# Patient Record
Sex: FEMALE | Race: BLACK OR AFRICAN AMERICAN | NOT HISPANIC OR LATINO | Employment: UNEMPLOYED | URBAN - METROPOLITAN AREA
[De-identification: names, ages, dates, MRNs, and addresses within clinical notes are randomized per-mention and may not be internally consistent; named-entity substitution may affect disease eponyms.]

---

## 2017-06-16 ENCOUNTER — TELEPHONE (OUTPATIENT)
Dept: FAMILY MEDICINE | Facility: CLINIC | Age: 22
End: 2017-06-16

## 2017-06-16 NOTE — TELEPHONE ENCOUNTER
----- Message from Jennifer Treviño sent at 6/16/2017  1:23 PM CDT -----  Contact: self  Patient called requesting a copy of last TB results and would like to  today. Please contact her at [phone].    Thanks!

## 2017-06-20 ENCOUNTER — TELEPHONE (OUTPATIENT)
Dept: FAMILY MEDICINE | Facility: CLINIC | Age: 22
End: 2017-06-20

## 2017-06-20 NOTE — TELEPHONE ENCOUNTER
----- Message from Cary John sent at 6/20/2017  2:24 PM CDT -----  patient is requesting a TB skin test for tomorrow. Please call at [phone] thank you!

## 2017-06-20 NOTE — TELEPHONE ENCOUNTER
Advised patient that she isn't due for another PPD placement until 10/2017. Patient verbalized understanding.

## 2017-10-06 ENCOUNTER — TELEPHONE (OUTPATIENT)
Dept: FAMILY MEDICINE | Facility: CLINIC | Age: 22
End: 2017-10-06

## 2017-10-06 NOTE — TELEPHONE ENCOUNTER
----- Message from Jennifer Treviño sent at 10/6/2017  3:45 PM CDT -----  Contact: self  Patient called stating that she went to ED with flu symptoms was sent home stating that she did not have the flu. She brought her daughter to ED with the same symptoms and she was diagnosed with he flu. Patient is requesting some type of medication to help. Please contact her at [phone]    Thanks!

## 2017-10-07 NOTE — TELEPHONE ENCOUNTER
I would need to know when she went to the ED.  The medication is only good if started within 72 hours of the symptoms starting.    Thanks,  Dr. Mcelroy

## 2017-12-22 ENCOUNTER — HOSPITAL ENCOUNTER (OUTPATIENT)
Dept: RADIOLOGY | Facility: OTHER | Age: 22
Discharge: HOME | End: 2017-12-22
Attending: STUDENT IN AN ORGANIZED HEALTH CARE EDUCATION/TRAINING PROGRAM
Payer: MEDICAID

## 2017-12-22 ENCOUNTER — OFFICE VISIT (OUTPATIENT)
Dept: OBSTETRICS AND GYNECOLOGY | Facility: CLINIC | Age: 22
End: 2017-12-22
Payer: MEDICAID

## 2017-12-22 VITALS
BODY MASS INDEX: 28.13 KG/M2 | SYSTOLIC BLOOD PRESSURE: 120 MMHG | DIASTOLIC BLOOD PRESSURE: 80 MMHG | HEIGHT: 63 IN | WEIGHT: 158.75 LBS

## 2017-12-22 DIAGNOSIS — R10.2 PELVIC PAIN: ICD-10-CM

## 2017-12-22 DIAGNOSIS — N92.6 IRREGULAR UTERINE BLEEDING: ICD-10-CM

## 2017-12-22 DIAGNOSIS — Z20.2 POSSIBLE EXPOSURE TO STD: ICD-10-CM

## 2017-12-22 DIAGNOSIS — N89.8 VAGINAL DISCHARGE: ICD-10-CM

## 2017-12-22 DIAGNOSIS — R10.2 PELVIC PAIN: Primary | ICD-10-CM

## 2017-12-22 LAB
B-HCG UR QL: NEGATIVE
C TRACH DNA SPEC QL NAA+PROBE: NOT DETECTED
CANDIDA RRNA VAG QL PROBE: NEGATIVE
CTP QC/QA: YES
G VAGINALIS RRNA GENITAL QL PROBE: POSITIVE
N GONORRHOEA DNA SPEC QL NAA+PROBE: NOT DETECTED
T VAGINALIS RRNA GENITAL QL PROBE: NEGATIVE

## 2017-12-22 PROCEDURE — 99203 OFFICE O/P NEW LOW 30 MIN: CPT | Mod: S$PBB,,, | Performed by: OBSTETRICS & GYNECOLOGY

## 2017-12-22 PROCEDURE — 87480 CANDIDA DNA DIR PROBE: CPT

## 2017-12-22 PROCEDURE — 76830 TRANSVAGINAL US NON-OB: CPT | Mod: 26,,, | Performed by: INTERNAL MEDICINE

## 2017-12-22 PROCEDURE — 99999 PR PBB SHADOW E&M-EST. PATIENT-LVL III: CPT | Mod: PBBFAC,,, | Performed by: OBSTETRICS & GYNECOLOGY

## 2017-12-22 PROCEDURE — 81025 URINE PREGNANCY TEST: CPT | Mod: PBBFAC,PO | Performed by: OBSTETRICS & GYNECOLOGY

## 2017-12-22 PROCEDURE — 99213 OFFICE O/P EST LOW 20 MIN: CPT | Mod: PBBFAC,25,PO | Performed by: OBSTETRICS & GYNECOLOGY

## 2017-12-22 PROCEDURE — 76856 US EXAM PELVIC COMPLETE: CPT | Mod: TC

## 2017-12-22 PROCEDURE — 87491 CHLMYD TRACH DNA AMP PROBE: CPT

## 2017-12-22 PROCEDURE — 76856 US EXAM PELVIC COMPLETE: CPT | Mod: 26,,, | Performed by: INTERNAL MEDICINE

## 2017-12-22 NOTE — PROGRESS NOTES
"History & Physical  Gynecology      SUBJECTIVE:     Chief Complaint: Pelvic Pain and Vaginal Bleeding (irregular bleeding)       History of Present Illness:  Patient is a 23yo  who presents complaining of pelvic pain and irregular bleeding. She had an  12 months ago. Prior to this, she reports regular cycles with normal flow. She had a Mirena placed in 2017, and complained of heavy bleeding. The mirena was removed in July. Patient reports the bleeding continued after Mirena removal. She will have bleeding for 2 weeks approximately twice per month. She has not bled since .    Patient also complains of pelvic pain that she describes as "mild contractions." she denies Fever/chills/N/V.    She has a questionable history of ovarian and breast cancer in her family. Reports her mother and grandmother were diagnosed in their 20s, but have survived.       Review of patient's allergies indicates:   Allergen Reactions    Amoxicillin Swelling    Augmentin [amoxicillin-pot clavulanate]     Naproxen      rash       Past Medical History:   Diagnosis Date    Depression     Migraine headache     Sinusitis      History reviewed. No pertinent surgical history.  OB History      Para Term  AB Living    2 2 1 1   1    SAB TAB Ectopic Multiple Live Births                     Family History   Problem Relation Age of Onset    Hypertension Mother     Ovarian cancer Mother     Colon cancer Maternal Aunt      Social History   Substance Use Topics    Smoking status: Never Smoker    Smokeless tobacco: Never Used    Alcohol use No       Current Outpatient Prescriptions   Medication Sig    cetirizine (ZYRTEC) 10 MG tablet TK 1 T PO QD PRN FOR CONGESTION    pantoprazole (PROTONIX) 40 MG tablet TK 1 T PO QD    PRENATAL 28 mg iron- 800 mcg Tab TK 1 T PO QD    promethazine (PHENERGAN) 25 MG tablet TK 1 T PO Q 6 H PRN FOR NAUSEA OR HEADACHES     No current facility-administered medications for this visit.  "         Review of Systems:  Review of Systems   Constitutional: Negative.  Negative for chills and fever.   Eyes: Negative.    Respiratory: Negative for shortness of breath.    Cardiovascular: Negative for chest pain.   Gastrointestinal: Negative for abdominal pain, bloating and constipation.   Endocrine: Negative.    Genitourinary: Positive for menstrual problem, pelvic pain and vaginal bleeding. Negative for dyspareunia and frequency.   Musculoskeletal: Negative for back pain.   Skin:  Negative.   Neurological: Negative for headaches.   Hematological: Negative.    Psychiatric/Behavioral: Negative.    Breast: Negative for breast mass and breast pain       OBJECTIVE:     Physical Exam:  Physical Exam   Constitutional: She is oriented to person, place, and time. She appears well-developed and well-nourished. No distress.   Cardiovascular: Normal rate and regular rhythm.    Pulmonary/Chest: Effort normal. No respiratory distress.   Abdominal: Soft. She exhibits no distension. There is no tenderness. There is no guarding.   Genitourinary: There is no rash, tenderness, lesion or injury on the right labia. There is no rash, tenderness, lesion or injury on the left labia. Uterus is not tender. Cervix exhibits no motion tenderness and no friability. Right adnexum displays no mass and no tenderness. Left adnexum displays no mass and no tenderness. No tenderness or bleeding in the vagina. No signs of injury around the vagina. No vaginal discharge found.   Musculoskeletal: Normal range of motion. She exhibits no edema or tenderness.   Neurological: She is alert and oriented to person, place, and time.   Skin: She is not diaphoretic.         ASSESSMENT:       ICD-10-CM ICD-9-CM    1. Pelvic pain R10.2 [id number] POCT Urine Pregnancy      C. trachomatis/N. gonorrhoeae by AMP DNA Cervicovaginal      Vaginosis Screen by DNA Probe      CBC auto differential      TSH      US Transvaginal Non OB          Plan:      Kary was seen  today for pelvic pain and vaginal bleeding.    Diagnoses and all orders for this visit:    Pelvic pain  -     POCT Urine Pregnancy  -     C. trachomatis/N. gonorrhoeae by AMP DNA Cervicovaginal  -     Vaginosis Screen by DNA Probe  -     CBC auto differential; Future  -     TSH; Future  -     US Transvaginal Non OB; Future  - No explanation for abnormal bleeding at this time. Patient may be going back to regular cycles since she has not bled in one month. Advised her to start the patch when she has her next cycle as prescribed by her other OBGYN        Orders Placed This Encounter   Procedures    C. trachomatis/N. gonorrhoeae by AMP DNA Cervicovaginal    Vaginosis Screen by DNA Probe    US Transvaginal Non OB    CBC auto differential    TSH    POCT Urine Pregnancy       Return if symptoms worsen or fail to improve.     Counseling time: 45 minutes    Jerry Christine

## 2018-01-05 ENCOUNTER — TELEPHONE (OUTPATIENT)
Dept: OBSTETRICS AND GYNECOLOGY | Facility: HOSPITAL | Age: 23
End: 2018-01-05

## 2018-01-05 NOTE — TELEPHONE ENCOUNTER
Called patient to inform her of +Affirm. Patient reported she had received antibiotics already, and was no longer feeling symptoms.     Maximus Christine MD  PGY-2 OB/GYN  [phone]

## 2018-06-02 ENCOUNTER — HOSPITAL ENCOUNTER (EMERGENCY)
Facility: OTHER | Age: 23
Discharge: HOME | End: 2018-06-02
Attending: OBSTETRICS & GYNECOLOGY
Payer: MEDICAID

## 2018-06-02 VITALS
SYSTOLIC BLOOD PRESSURE: 114 MMHG | DIASTOLIC BLOOD PRESSURE: 56 MMHG | TEMPERATURE: 97 F | HEART RATE: 79 BPM | RESPIRATION RATE: 18 BRPM | OXYGEN SATURATION: 100 %

## 2018-06-02 DIAGNOSIS — Z3A.29 29 WEEKS GESTATION OF PREGNANCY: Primary | ICD-10-CM

## 2018-06-02 DIAGNOSIS — Z36.89 NON-STRESS TEST REACTIVE ON FETAL SURVEILLANCE: ICD-10-CM

## 2018-06-02 DIAGNOSIS — O36.8120 DECREASED FETAL MOVEMENT AFFECTING MANAGEMENT OF PREGNANCY IN SECOND TRIMESTER, SINGLE OR UNSPECIFIED FETUS: ICD-10-CM

## 2018-06-02 PROCEDURE — 99283 EMERGENCY DEPT VISIT LOW MDM: CPT | Mod: 25

## 2018-06-02 PROCEDURE — 59025 FETAL NON-STRESS TEST: CPT

## 2018-06-02 NOTE — DISCHARGE INSTRUCTIONS
Kick Counts    Its normal to worry about your babys health. One way you can know your babys doing well is to record the babys movements once a day. This is called a kick count. Remember to take your kick count records to all your appointments with your healthcare provider.  How to count kicks  Here are tips for counting kicks:  · Choose a time when the baby is active, such as after a meal.   · Sit comfortably or lie on your side.   · The first time the baby moves, write down the time.   · Count each movement until the baby has moved 10 times. This can take from 20 minutes to 2 hours.   · Try to do it at the same time each day.  When to call your healthcare provider  Call your healthcare provider right away if you notice any of the following:  · Your baby moves fewer than 10 times in 2 hours while youre doing kick counts.  · Your baby moves much less often than on the days before.  · You have not felt your baby move all day.  Date Last Reviewed: 8/5/2015 © 2000-2017 [employer]. [address] [place] [address], [place] [address]. All rights reserved. This information is not intended as a substitute for professional medical care. Always follow your healthcare professional's instructions.

## 2018-06-02 NOTE — ED PROVIDER NOTES
Encounter Date: 2018       History   No chief complaint on file.    Kary Petit is a 22 y.o.  at Unknown who presents to the OB ED today (2018) with CC of decreased fetal movement. She has been at work this morning, and despite eating and drinking a sugary drink, and not being busy at work, she has felt less fetal movement than usual. This has not happened to her before.  She reports no vaginal bleeding, no leakage of fluid, and no contractions.   She reports good  fetal movement at baseline.   PNC at OSH..          Review of patient's allergies indicates:   Allergen Reactions    Amoxicillin Swelling    Augmentin [amoxicillin-pot clavulanate]     Naproxen      rash     Past Medical History:   Diagnosis Date    Depression     Migraine headache     Sinusitis      No past surgical history on file.  Family History   Problem Relation Age of Onset    Hypertension Mother     Ovarian cancer Mother     Colon cancer Maternal Aunt      Social History   Substance Use Topics    Smoking status: Never Smoker    Smokeless tobacco: Never Used    Alcohol use No     Review of Systems   Constitutional: Negative for activity change, chills and fever.   HENT: Negative for congestion.    Eyes: Negative for visual disturbance.   Respiratory: Negative for chest tightness and shortness of breath.    Cardiovascular: Negative for chest pain and leg swelling.   Gastrointestinal: Negative for abdominal pain, nausea and vomiting.   Genitourinary: Negative for dysuria, pelvic pain, vaginal bleeding and vaginal discharge.   Musculoskeletal: Negative for back pain.   Skin: Negative for color change.   Neurological: Negative for light-headedness and headaches.   Psychiatric/Behavioral: Negative for agitation.       Physical Exam     Initial Vitals [18 1226]   BP Pulse Resp Temp SpO2   (!) 114/56 79 18 97 °F (36.1 °C) 100 %      MAP       75.33         Physical Exam    Vitals reviewed.  Constitutional: She  appears well-developed and well-nourished. She is not diaphoretic. No distress.   HENT:   Head: Normocephalic and atraumatic.   Eyes: Conjunctivae are normal.   Neck: Normal range of motion.   Cardiovascular: Normal rate and intact distal pulses.   Pulmonary/Chest: Breath sounds normal. No respiratory distress.   Abdominal: Soft. Bowel sounds are normal.   Neurological: She is alert and oriented to person, place, and time.   Psychiatric: She has a normal mood and affect. Her behavior is normal. Judgment and thought content normal.         ED Course   Obtain Fetal nonstress test (NST)  Date/Time: 2018 12:48 PM  Performed by: BUBBA MCCRACKEN  Authorized by: BUBBA MCCRACKEN     Nonstress Test:     Variability:  6-25 BPM    Decelerations:  None    Accelerations:  10 bpm    Acoustic Stimulator: No      Baseline:  145    Uterine Irritability: No      Contractions:  Not present  Biophysical Profile:     Nonstress Test Interpretation: reactive      Overall Impression:  Reassuring  Post-procedure:     Patient tolerance:  Patient tolerated the procedure well with no immediate complications        Labs Reviewed - No data to display          Medical Decision Making:   Initial Assessment:   Decreased fetal movement.  ED Management:  NST reactive and reassuring  No contractions on toco  VSS, afebrile  Denies abdominal pain  US with adequate fluid - MVP 4 cm in RUQ  Patient reports feeling good FM in OB ED  Counseled on kick counts,  labor precautions given  Will f/u with pri[facility]ary OB as needed    Other:   I discussed test(s) with the performing physician.  I have discussed this case with another health care provider.                      Clinical Impression:     1. 29 weeks gestation of pregnancy    2. Decreased fetal movement affecting management of pregnancy in second trimester, single or unspecified fetus    3. Non-stress test reactive on fetal surveillance          No orders to display                               Robin King MD  Resident  06/02/18 [id number]

## 2021-04-16 ENCOUNTER — PATIENT MESSAGE (OUTPATIENT)
Dept: RESEARCH | Facility: HOSPITAL | Age: 26
End: 2021-04-16

## 2024-10-17 ENCOUNTER — HOSPITAL ENCOUNTER (EMERGENCY)
Facility: HOSPITAL | Age: 29
Discharge: HOME | End: 2024-10-17
Attending: PEDIATRICS
Payer: MEDICAID

## 2024-10-17 VITALS
HEIGHT: 63 IN | HEART RATE: 72 BPM | DIASTOLIC BLOOD PRESSURE: 82 MMHG | RESPIRATION RATE: 18 BRPM | OXYGEN SATURATION: 100 % | SYSTOLIC BLOOD PRESSURE: 133 MMHG | TEMPERATURE: 98 F | WEIGHT: 171 LBS | BODY MASS INDEX: 30.3 KG/M2

## 2024-10-17 DIAGNOSIS — R25.1 SHAKING: ICD-10-CM

## 2024-10-17 DIAGNOSIS — R56.9 SEIZURE: Primary | ICD-10-CM

## 2024-10-17 LAB
ALBUMIN SERPL BCP-MCNC: 3.7 G/DL (ref 3.5–5.2)
ALP SERPL-CCNC: 76 U/L (ref 55–135)
ALT SERPL W/O P-5'-P-CCNC: 19 U/L (ref 10–44)
AMPHET+METHAMPHET UR QL: NEGATIVE
ANION GAP SERPL CALC-SCNC: 9 MMOL/L (ref 8–16)
AST SERPL-CCNC: 20 U/L (ref 10–40)
BARBITURATES UR QL SCN>200 NG/ML: NEGATIVE
BASOPHILS # BLD AUTO: 0.03 K/UL (ref 0–0.2)
BASOPHILS NFR BLD: 0.7 % (ref 0–1.9)
BENZODIAZ UR QL SCN>200 NG/ML: NEGATIVE
BILIRUB SERPL-MCNC: 0.2 MG/DL (ref 0.1–1)
BILIRUB UR QL STRIP: NEGATIVE
BUN SERPL-MCNC: 6 MG/DL (ref 6–20)
BZE UR QL SCN: NEGATIVE
CALCIUM SERPL-MCNC: 8.5 MG/DL (ref 8.7–10.5)
CANNABINOIDS UR QL SCN: ABNORMAL
CHLORIDE SERPL-SCNC: 108 MMOL/L (ref 95–110)
CK SERPL-CCNC: 128 U/L (ref 20–180)
CLARITY UR REFRACT.AUTO: CLEAR
CO2 SERPL-SCNC: 18 MMOL/L (ref 23–29)
COLOR UR AUTO: NORMAL
CREAT SERPL-MCNC: 0.7 MG/DL (ref 0.5–1.4)
CREAT UR-MCNC: 48 MG/DL (ref 15–325)
DIFFERENTIAL METHOD BLD: ABNORMAL
EOSINOPHIL # BLD AUTO: 0.2 K/UL (ref 0–0.5)
EOSINOPHIL NFR BLD: 3.6 % (ref 0–8)
ERYTHROCYTE [DISTWIDTH] IN BLOOD BY AUTOMATED COUNT: 15.9 % (ref 11.5–14.5)
EST. GFR  (NO RACE VARIABLE): >60 ML/MIN/1.73 M^2
ETHANOL UR-MCNC: <10 MG/DL
GLUCOSE SERPL-MCNC: 92 MG/DL (ref 70–110)
GLUCOSE UR QL STRIP: NEGATIVE
HCT VFR BLD AUTO: 35.1 % (ref 37–48.5)
HCV AB SERPL QL IA: NORMAL
HGB BLD-MCNC: 11.1 G/DL (ref 12–16)
HGB UR QL STRIP: NEGATIVE
HIV 1+2 AB+HIV1 P24 AG SERPL QL IA: NORMAL
IMM GRANULOCYTES # BLD AUTO: 0.03 K/UL (ref 0–0.04)
IMM GRANULOCYTES NFR BLD AUTO: 0.7 % (ref 0–0.5)
KETONES UR QL STRIP: NEGATIVE
LEUKOCYTE ESTERASE UR QL STRIP: NEGATIVE
LYMPHOCYTES # BLD AUTO: 1.2 K/UL (ref 1–4.8)
LYMPHOCYTES NFR BLD: 27.5 % (ref 18–48)
MCH RBC QN AUTO: 25.2 PG (ref 27–31)
MCHC RBC AUTO-ENTMCNC: 31.6 G/DL (ref 32–36)
MCV RBC AUTO: 80 FL (ref 82–98)
METHADONE UR QL SCN>300 NG/ML: NEGATIVE
MONOCYTES # BLD AUTO: 0.4 K/UL (ref 0.3–1)
MONOCYTES NFR BLD: 7.9 % (ref 4–15)
NEUTROPHILS # BLD AUTO: 2.6 K/UL (ref 1.8–7.7)
NEUTROPHILS NFR BLD: 59.6 % (ref 38–73)
NITRITE UR QL STRIP: NEGATIVE
NRBC BLD-RTO: 0 /100 WBC
OHS QRS DURATION: 90 MS
OHS QTC CALCULATION: 444 MS
OPIATES UR QL SCN: NEGATIVE
PCP UR QL SCN>25 NG/ML: NEGATIVE
PH UR STRIP: 8 [PH] (ref 5–8)
PLATELET # BLD AUTO: 261 K/UL (ref 150–450)
PMV BLD AUTO: 10.7 FL (ref 9.2–12.9)
POTASSIUM SERPL-SCNC: 3.7 MMOL/L (ref 3.5–5.1)
PROT SERPL-MCNC: 7.3 G/DL (ref 6–8.4)
PROT UR QL STRIP: NEGATIVE
RBC # BLD AUTO: 4.4 M/UL (ref 4–5.4)
SODIUM SERPL-SCNC: 135 MMOL/L (ref 136–145)
SP GR UR STRIP: 1.01 (ref 1–1.03)
TOXICOLOGY INFORMATION: ABNORMAL
URN SPEC COLLECT METH UR: NORMAL
WBC # BLD AUTO: 4.43 K/UL (ref 3.9–12.7)

## 2024-10-17 PROCEDURE — 82550 ASSAY OF CK (CPK): CPT

## 2024-10-17 PROCEDURE — 80307 DRUG TEST PRSMV CHEM ANLYZR: CPT

## 2024-10-17 PROCEDURE — 85025 COMPLETE CBC W/AUTO DIFF WBC: CPT

## 2024-10-17 PROCEDURE — 93005 ELECTROCARDIOGRAM TRACING: CPT

## 2024-10-17 PROCEDURE — 93010 ELECTROCARDIOGRAM REPORT: CPT | Mod: ,,, | Performed by: INTERNAL MEDICINE

## 2024-10-17 PROCEDURE — 99285 EMERGENCY DEPT VISIT HI MDM: CPT | Mod: 25

## 2024-10-17 PROCEDURE — 25000003 PHARM REV CODE 250

## 2024-10-17 PROCEDURE — 81003 URINALYSIS AUTO W/O SCOPE: CPT | Mod: 59

## 2024-10-17 PROCEDURE — 86803 HEPATITIS C AB TEST: CPT | Performed by: PHYSICIAN ASSISTANT

## 2024-10-17 PROCEDURE — 87389 HIV-1 AG W/HIV-1&-2 AB AG IA: CPT | Performed by: PHYSICIAN ASSISTANT

## 2024-10-17 PROCEDURE — 80053 COMPREHEN METABOLIC PANEL: CPT

## 2024-10-17 RX ORDER — LEVETIRACETAM 500 MG/5ML
20 INJECTION, SOLUTION, CONCENTRATE INTRAVENOUS EVERY 12 HOURS
Status: DISCONTINUED | OUTPATIENT
Start: 2024-10-17 | End: 2024-10-17

## 2024-10-17 RX ORDER — LIDOCAINE HYDROCHLORIDE 20 MG/ML
5 SOLUTION OROPHARYNGEAL
Status: COMPLETED | OUTPATIENT
Start: 2024-10-17 | End: 2024-10-17

## 2024-10-17 RX ORDER — LEVETIRACETAM 500 MG/1
500 TABLET ORAL 2 TIMES DAILY
Status: DISCONTINUED | OUTPATIENT
Start: 2024-10-17 | End: 2024-10-17

## 2024-10-17 RX ORDER — LEVETIRACETAM 500 MG/1
500 TABLET ORAL 2 TIMES DAILY
Qty: 60 TABLET | Refills: 4 | Status: SHIPPED | OUTPATIENT
Start: 2024-10-17 | End: 2025-03-16

## 2024-10-17 RX ORDER — LEVETIRACETAM 500 MG/1
500 TABLET ORAL ONCE
Status: COMPLETED | OUTPATIENT
Start: 2024-10-17 | End: 2024-10-17

## 2024-10-17 RX ORDER — LORAZEPAM 0.5 MG/1
0.5 TABLET ORAL EVERY 6 HOURS PRN
Status: DISCONTINUED | OUTPATIENT
Start: 2024-10-17 | End: 2024-10-17 | Stop reason: HOSPADM

## 2024-10-17 RX ADMIN — LEVETIRACETAM 500 MG: 500 TABLET, FILM COATED ORAL at 10:10

## 2024-10-17 RX ADMIN — LIDOCAINE HYDROCHLORIDE 5 ML: 20 SOLUTION ORAL at 12:10

## 2024-10-17 NOTE — ED PROVIDER NOTES
Encounter Date: 10/17/2024       History     Chief Complaint   Patient presents with    Seizures     Possible seizure while sleeping. SO states he woke and she was shaking. Patient doesn't remember anything     Female with no past medical history that presents to emergency department after having seizure-like activity.  Per significant other he was woken up in the morning proximally at 6:22 a.m. her shaking in bed.  States that she was foaming at the mouth and that they could not wake her up.  This seizure-like activity lasted for approximately 5 minutes.  Afterwards she was confused and nonverbal.  Patient called 911 however was concerned about the amount of time he was taking so they disturb her to the emergency department.  On arrival to the Emergency prominent proximally 640 they noted that she had started to become verbal and returned back to her baseline.  Currently is admitting to mild headache otherwise no other symptoms.  Denies chest pain, denies fevers, denies allergies, denies taking medications    The history is provided by the patient.     Review of patient's allergies indicates:   Allergen Reactions    Amoxicillin Swelling    Augmentin [amoxicillin-pot clavulanate]     Naproxen      rash     Past Medical History:   Diagnosis Date    Depression     Migraine headache     Sinusitis      Past Surgical History:   Procedure Laterality Date    HYSTERECTOMY       Family History   Problem Relation Name Age of Onset    Hypertension Mother      Ovarian cancer Mother      Colon cancer Maternal Aunt       Social History     Tobacco Use    Smoking status: Never    Smokeless tobacco: Never   Substance Use Topics    Alcohol use: No    Drug use: No     Review of Systems  ROS negative except as noted in HPI    Physical Exam     Initial Vitals [10/17/24 0638]   BP Pulse Resp Temp SpO2   (!) 151/86 104 16 98 °F (36.7 °C) 98 %      MAP       --         Physical Exam    Nursing note and vitals reviewed.  Constitutional:  She is not diaphoretic. No distress.   HENT:   Head: Normocephalic and atraumatic.   Right Ear: External ear normal.   Left Ear: External ear normal.   Nose: Nose normal.   Bite marks on right lateral tongue   Eyes: Conjunctivae and EOM are normal.   Neck:   Normal range of motion.  Cardiovascular:  Normal rate, regular rhythm and normal heart sounds.           Pulmonary/Chest: Breath sounds normal. No respiratory distress.   Abdominal: Abdomen is soft. She exhibits no distension.   Musculoskeletal:         General: No tenderness or edema.      Cervical back: Normal range of motion.     Neurological: She is alert and oriented to person, place, and time. She has normal strength. No cranial nerve deficit or sensory deficit. GCS score is 15. GCS eye subscore is 4. GCS verbal subscore is 5. GCS motor subscore is 6.   Skin: Skin is warm. Capillary refill takes less than 2 seconds.         ED Course   Procedures  Labs Reviewed   CBC W/ AUTO DIFFERENTIAL - Abnormal       Result Value    WBC 4.43      RBC 4.40      Hemoglobin 11.1 (*)     Hematocrit 35.1 (*)     MCV 80 (*)     MCH 25.2 (*)     MCHC 31.6 (*)     RDW 15.9 (*)     Platelets 261      MPV 10.7      Immature Granulocytes 0.7 (*)     Gran # (ANC) 2.6      Immature Grans (Abs) 0.03      Lymph # 1.2      Mono # 0.4      Eos # 0.2      Baso # 0.03      nRBC 0      Gran % 59.6      Lymph % 27.5      Mono % 7.9      Eosinophil % 3.6      Basophil % 0.7      Differential Method Automated     COMPREHENSIVE METABOLIC PANEL - Abnormal    Sodium 135 (*)     Potassium 3.7      Chloride 108      CO2 18 (*)     Glucose 92      BUN 6      Creatinine 0.7      Calcium 8.5 (*)     Total Protein 7.3      Albumin 3.7      Total Bilirubin 0.2      Alkaline Phosphatase 76      AST 20      ALT 19      eGFR >60.0      Anion Gap 9     TOXICOLOGY SCREEN, URINE, RANDOM (COMPLIANCE) - Abnormal    Alcohol, Urine <10      Benzodiazepines Negative      Methadone metabolites Negative       Cocaine (Metab.) Negative      Opiate Scrn, Ur Negative      Barbiturate Screen, Ur Negative      Amphetamine Screen, Ur Negative      THC Presumptive Positive (*)     Phencyclidine Negative      Creatinine, Urine 48.0      Toxicology Information SEE COMMENT      Narrative:     Specimen Source->Urine   HIV 1 / 2 ANTIBODY    HIV 1/2 Ag/Ab Non-reactive      Narrative:     Release to patient->Immediate   HEPATITIS C ANTIBODY    Hepatitis C Ab Non-reactive      Narrative:     Release to patient->Immediate   CK         URINALYSIS, REFLEX TO URINE CULTURE    Specimen UA Urine, Clean Catch      Color, UA Straw      Appearance, UA Clear      pH, UA 8.0      Specific Gravity, UA 1.010      Protein, UA Negative      Glucose, UA Negative      Ketones, UA Negative      Bilirubin (UA) Negative      Occult Blood UA Negative      Nitrite, UA Negative      Leukocytes, UA Negative      Narrative:     Specimen Source->Urine        ECG Results              EKG 12-lead (Final result)        Collection Time Result Time QRS Duration OHS QTC Calculation    10/17/24 07:50:46 10/17/24 08:29:45 90 444                     Final result by Interface, Lab In [facility] (10/17/24 08:29:50)                   Narrative:    Test Reason : R25.1,    Vent. Rate : 079 BPM     Atrial Rate : 079 BPM     P-R Int : 142 ms          QRS Dur : 090 ms      QT Int : 388 ms       P-R-T Axes : 058 054 029 degrees     QTc Int : 444 ms    Normal sinus rhythm  Normal ECG  When compared with ECG of 02-APR-2020 19:09,  No significant change was found  Confirmed by Anibal Domingo MD ([id number]) on 10/17/2024 8:29:42 AM    Referred By: [email or web address]   SELF           Confirmed By:Anibal Domingo MD                                  Imaging Results              CT Head Without Contrast (Final result)  Result time 10/17/24 08:39:51      Final result by Tico Head DO (10/17/24 08:39:51)                   Impression:      No acute intracranial findings specifically without  evidence for acute intracranial hemorrhage or sulcal effacement to suggest large territory recent infarction    Thin cortical calcifications left posterior temporal cortex are identified and allowing for only prior report imaging for comparison relatively stable from prior reports.  This remains nonspecific and may be sequela of remote ischemia/infarction with underlying Sturge-Tao remaining differential.    Clinical correlation and comparison with prior outside imaging would be helpful.  Further evaluation with MRI as warranted clinically.      Electronically signed by: Tico Head DO  Date:    10/17/2024  Time:    08:39               Narrative:    EXAMINATION:  CT HEAD WITHOUT CONTRAST    CLINICAL HISTORY:  Seizure, new-onset, no history of trauma;    TECHNIQUE:  Multiple sequential 5 mm axial images of the head without contrast.  Coronal and sagittal reformatted imaging from the axial acquisition.    COMPARISON:  Prior outside imaging reports CT head 06/22/2023, CTA head 06/23/2023 and MRI 01/13/2023    FINDINGS:  Peripheral calcifications along the left posterior temporal cortex are identified.  Configuration allowing for only prior reports available for comparison relatively similar.  This is nonspecific and may be sequela of prior ischemia/infarction.  Prior infection or sequela of Sturge-Tao angiomatosis remains in the differential.  Comparison with prior imaging would be helpful to assess for stability.    There is no evidence for acute intracranial hemorrhage.  Ventricles normal in size without hydrocephalus.  No midline shift or significant mass effect.  There is mild moderate patchy opacities in the ethmoid air cells bilaterally with mild right maxillary mucosal thickening.                                       Medications   LORazepam tablet 0.5 mg (has no administration in time range)   levETIRAcetam tablet 500 mg (500 mg Oral Given 10/17/24 1007)   LIDOcaine viscous HCl 2% oral solution 5 mL (5  mLs Oral Given 10/17/24 1230)     Medical Decision Making  Female with no past medical history that presents to emergency department after having seizure-like activity.      On initial evaluation patient was alert and oriented and speaking full sentences and cooperative with the history and physical examination.  Patient is vitals within normal limits.      Differential for patient's presentation is consistent with new onset seizure.  Appears to be limited episode of approximately 5-10 minutes per family members.  She had tongue biting, tonic-clonic activity followed by postictal state.  Patient has no recollection of the event.  Differential is broad, considering cardiac dysrhythmia, intracranial lesion, tox or substance use.  Also considering disorders asleep however patient reportedly had woken up prior to onset of seizure.  No known trauma from family member or from patient.    See ED course for interpretation of workup.    Discussed with on-call Neurology, they recommended and agreed with our decision to start her on antiepileptic therapy.  Patient provided with ER return precautions as as as seizure education including avoiding swimming, bathing and not driving for 6 months.  Prescription sent to main pharmacy to discharge.  Family members at bedside updated all questions were answered prior to discharge.    Amount and/or Complexity of Data Reviewed  Labs: ordered. Decision-making details documented in ED Course.  Radiology: ordered. Decision-making details documented in ED Course.  ECG/medicine tests:  Decision-making details documented in ED Course.    Risk  Prescription drug management.               ED Course as of 10/17/24 1524   Thu Oct 17, 2024   0800 EKG 12-lead  Normal sinus rhythm, no acute ischemia, normal intervals [AC]   0834 CPK: 128 [TK]   0834 Creatinine: 0.7 [TK]   0834 BUN: 6 [TK]   0834 Sodium(!): 135  Likely not cause of today's symptoms.  [TK]   0840 Sodium(!): 135 [AC]   0840 Potassium:  3.7 [AC]   0840 CO2(!): 18 [AC]   0840 WBC: 4.43 [AC]   0840 Hemoglobin(!): 11.1 [AC]   0856 CT Head Without Contrast  Atypical unilateral calcifications that have been seen previously on other imaging, with additional advanced imaging previously ruling out vascular pathology. Will discuss with neuro re: if this could represent focus of possible seizure, and a plan of care. [AC]   1054 Marijuana (THC) Metabolite(!): Presumptive Positive [TK]      ED Course User Index  [AC] Mark Toney DO  [TK] Susy Gerber DO                           Clinical Impression:  Final diagnoses:  [R25.1] Shaking  [R56.9] Seizure (Primary)          ED Disposition Condition    Discharge Stable          ED Prescriptions       Medication Sig Dispense Start Date End Date Auth. Provider    levETIRAcetam (KEPPRA) 500 MG Tab Take 1 tablet (500 mg total) by mouth 2 (two) times daily. 60 tablet 10/17/2024 3/16/2025 Susy Gerber DO          Follow-up Information       Follow up With Specialties Details Why Contact Info Additional Information    [facility] - Emergency Dept Emergency Medicine  If symptoms worsen [address] [facility] [address] [phone]     Lilibeth, Gerry FAY MD Family Medicine, Wound Care  As needed [address] [facility] [address]  [phone]       [facility] - Neurology 7th Fl Neurology   [address] [facility] [address] [phone] [facility] [place] - Main Building, 7th Floor Please park in [facility] and take Clinic elevator             Susy Gerber DO  Resident  10/17/24 4222

## 2024-10-17 NOTE — ED NOTES
Received report and assumed care of patient at this time.  Patient is AAOx4 with a calm affect and aware of environment. Airway is open and patent, respirations are spontaneous, normal effort and rate noted. Pt denies chest pain at this time. Skin warm and dry. Movement to all extremities noted. Bed placed in low position, side rails up x 2, call light is within reach of patient. Explanation of care provided to patient, pt placed on BP, pulse-ox and cardiac monitoring. Patient offers no complaints at this time. Awaiting further MD orders and bed assignment, POC continues.

## 2024-10-17 NOTE — ED NOTES
Pt identifiers Kary Petit were checked and are correct  LOC: The patient is awake, alert, aware of environment with an appropriate affect. Oriented x4, speaking appropriately  APPEARANCE: Pt rates right ear and right side of tongue pain an 8/10, in no acute distress, pt is clean and well groomed, clothing properly fastened  SKIN: Skin warm, dry and intact, normal skin turgor, moist mucus membranes Red area note on right side of tongue  RESPIRATORY: Airway is open and patent, respirations are spontaneous, even and unlabored, normal effort and rate  CARDIAC: Normal rate and rhythm, no peripheral edema noted, capillary refill < 3 seconds, bilateral radial pulses 2+  ABDOMEN: Soft, nontender, nondistended. Bowel sounds present   NEUROLOGIC: PERRL, facial expression is symmetrical, patient moving all extremities spontaneously, normal sensation in all extremities when touched with a finger.  Follows all commands appropriately  MUSCULOSKELETAL: No obvious deformities.

## 2024-10-17 NOTE — DISCHARGE INSTRUCTIONS
Has been discussed in the room, please avoid driving for 6 months, avoid bathing or swimming alone.

## 2024-10-17 NOTE — Clinical Note
"Kary"Jaswant Petit was seen and treated in our emergency department on 10/17/2024.  She may return to work on 04/24/2025.  Patient has been diagnosed with a clinical condition that prevents her being able to drive for the next 6 months     If you have any questions or concerns, please don't hesitate to call.      Susy Gerber, DO"

## 2024-10-25 ENCOUNTER — HOSPITAL ENCOUNTER (OUTPATIENT)
Dept: NEUROLOGY | Facility: CLINIC | Age: 29
Discharge: HOME | End: 2024-10-25
Payer: MEDICAID

## 2024-10-25 DIAGNOSIS — R56.9 SEIZURE: ICD-10-CM

## 2024-10-25 PROCEDURE — 95816 EEG AWAKE AND DROWSY: CPT | Mod: PBBFAC | Performed by: PSYCHIATRY & NEUROLOGY

## 2024-10-25 NOTE — PROCEDURES
EEG REPORT      Kary Petit  [id number]  1995    DATE OF SERVICE: 10/25/2024         METHODOLOGY      Extended electroencephalographic recording is made while the patient is ambulatory and continuing normal daily activities.  Electrodes are placed according to the International 10-20 placement system and included T1 and T2 electrode placement.  Twenty four (24) channels of digital signal (sampling rate of 512/sec) was simultaneously recorded from the scalp including EKG and eye monitors.  Recording band pass was 0.1 to 100 hz and all data was stored digitally on the recorder.  The patient is instructed to press an event button when clinical symptoms occur and write the symptoms into a diary. Activation procedures which include photic stimulation, hyperventilation and instructing patients to perform simple task are done in selected patients.        The EEG is displayed on a monitor screen and can be reformatted into different montages for evaluation.  The entire recoding is submitted for computer assisted analysis to detect spike and electrographic seizure activity.  The entire recording is visually reviewed and the times identified by computer analysis as being spikes or seizures are reviewed again.  Compresses spectral analysis (CSA) is also performed on the activity recorded from each individual channel.  This is displayed as a power display of frequencies from 0 to 30 Hz over time.   The CSA analysis is done and displayed continuously.  This is reviewed for asymmetries in power between homologous areas of the scalp and for presence of changes in power which canbe seen when seizures occur.  Sections of suspected abnormalities on the CSA is then compared with the original EEG recording.  .     eWise software was also utilized in the review of this study.  This software suite analyzes the EEG recording in multiple domains.  Coherence and rhythmicity is computed to identify EEG sections which may  contain organized seizures.  Each channel undergoes analysis to detect presence of spike and sharp waves which have special and morphological characteristic of epileptic activity.  The routine EEG recording is converted from spacial into frequency domain.  This is then displayed comparing homologous areas to identify areas of significant asymmetry.  Algorithm to identify non-cortically generated artifact is used to separate eye movement, EMG and other artifact from the EEG     Recording Times    A total of 00:30:56 hours of EEG was recorded.      EEG FINDINGS:  Background activity:   The background rhythm was characterized by alpha and anterior dominant beta activity with a 10Hz posterior dominant alpha rhythm at 30-70 microvolts.   Symmetry and continuity: the background was continuous and symmetric     Sleep:   Normal sleep transients including sleep spindles, K complexes, vertex waves were seen.    Activation procedures:   Photic stimulation was performed with no abnormalities seen  Hyperventilation was performed with no abnormalities seen    Abnormal activity:   No epileptiform discharges, periodic discharges, lateralized rhythmic delta activity or electrographic seizures were seen.    IMPRESSION:   Normal EEG of light sleep and the waking state.      Augustine Griffin MD  Neurology-Epilepsy.  [facility].

## 2024-11-06 ENCOUNTER — OFFICE VISIT (OUTPATIENT)
Dept: NEUROLOGY | Facility: CLINIC | Age: 29
End: 2024-11-06
Payer: MEDICAID

## 2024-11-06 VITALS — HEART RATE: 72 BPM | SYSTOLIC BLOOD PRESSURE: 157 MMHG | DIASTOLIC BLOOD PRESSURE: 97 MMHG

## 2024-11-06 DIAGNOSIS — R55 SYNCOPE, UNSPECIFIED SYNCOPE TYPE: ICD-10-CM

## 2024-11-06 DIAGNOSIS — G43.709 CHRONIC MIGRAINE WITHOUT AURA WITHOUT STATUS MIGRAINOSUS, NOT INTRACTABLE: ICD-10-CM

## 2024-11-06 DIAGNOSIS — R56.9 SEIZURE: Primary | ICD-10-CM

## 2024-11-06 PROBLEM — G43.909 MIGRAINES: Status: ACTIVE | Noted: 2024-11-06

## 2024-11-06 PROCEDURE — 99999 PR PBB SHADOW E&M-EST. PATIENT-LVL III: CPT | Mod: PBBFAC,,, | Performed by: STUDENT IN AN ORGANIZED HEALTH CARE EDUCATION/TRAINING PROGRAM

## 2024-11-06 PROCEDURE — 99213 OFFICE O/P EST LOW 20 MIN: CPT | Mod: PBBFAC | Performed by: STUDENT IN AN ORGANIZED HEALTH CARE EDUCATION/TRAINING PROGRAM

## 2024-11-06 NOTE — PATIENT INSTRUCTIONS
Epilepsy [place] website: [email or web address]://[email or web address].[email or web address]     Stop taking the Keppra once you get the Briviact. The Briviact will be 50 mg twice daily.    You have an order for an MRI Brain.    You have a referral to Cardiology to evaluate for the passing out episodes.

## 2024-11-06 NOTE — ASSESSMENT & PLAN NOTE
- switch  mg BID to BRIV 50 mg BID due to mood changes  - MRI Brain w/wo contrast  - seizure precautions
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
## 2024-11-13 ENCOUNTER — PATIENT MESSAGE (OUTPATIENT)
Dept: NEUROLOGY | Facility: CLINIC | Age: 29
End: 2024-11-13
Payer: MEDICAID

## 2024-11-14 DIAGNOSIS — R51.9 PERSISTENT HEADACHES: Primary | ICD-10-CM

## 2024-11-14 RX ORDER — METHOCARBAMOL 500 MG/1
500 TABLET, FILM COATED ORAL 4 TIMES DAILY PRN
Qty: 20 TABLET | Refills: 0 | Status: SHIPPED | OUTPATIENT
Start: 2024-11-14 | End: 2024-11-19

## 2024-11-14 NOTE — PROGRESS NOTES
Called patient regarding her headache which has been constant for the past 4 days, radiating up from posterior neck with significant muscle tenderness at the neck. Tried Excedrin once with temporary relief. Reviewed medication options including muscle relaxer and steroids for tension headache. Will send robaxin prescription; patient defers steroids.

## 2024-11-19 ENCOUNTER — PATIENT MESSAGE (OUTPATIENT)
Dept: RESEARCH | Facility: HOSPITAL | Age: 29
End: 2024-11-19
Payer: MEDICAID

## 2024-11-22 ENCOUNTER — PATIENT MESSAGE (OUTPATIENT)
Dept: RESEARCH | Facility: HOSPITAL | Age: 29
End: 2024-11-22
Payer: MEDICAID

## 2024-12-02 ENCOUNTER — HOSPITAL ENCOUNTER (OUTPATIENT)
Dept: RADIOLOGY | Facility: HOSPITAL | Age: 29
Discharge: HOME | End: 2024-12-02
Attending: STUDENT IN AN ORGANIZED HEALTH CARE EDUCATION/TRAINING PROGRAM
Payer: MEDICAID

## 2024-12-02 DIAGNOSIS — R56.9 SEIZURE: ICD-10-CM

## 2024-12-02 PROCEDURE — 70553 MRI BRAIN STEM W/O & W/DYE: CPT | Mod: TC

## 2024-12-02 PROCEDURE — 25500020 PHARM REV CODE 255: Performed by: STUDENT IN AN ORGANIZED HEALTH CARE EDUCATION/TRAINING PROGRAM

## 2024-12-02 PROCEDURE — A9585 GADOBUTROL INJECTION: HCPCS | Performed by: STUDENT IN AN ORGANIZED HEALTH CARE EDUCATION/TRAINING PROGRAM

## 2024-12-02 PROCEDURE — 70553 MRI BRAIN STEM W/O & W/DYE: CPT | Mod: 26,,, | Performed by: RADIOLOGY

## 2024-12-02 RX ORDER — GADOBUTROL 604.72 MG/ML
8 INJECTION INTRAVENOUS
Status: COMPLETED | OUTPATIENT
Start: 2024-12-02 | End: 2024-12-02

## 2024-12-02 RX ADMIN — GADOBUTROL 8 ML: 604.72 INJECTION INTRAVENOUS at 07:12

## 2024-12-03 ENCOUNTER — PATIENT MESSAGE (OUTPATIENT)
Dept: NEUROLOGY | Facility: CLINIC | Age: 29
End: 2024-12-03
Payer: MEDICAID

## 2024-12-05 DIAGNOSIS — Q82.8: Primary | ICD-10-CM

## 2024-12-05 NOTE — PROGRESS NOTES
Reviewed MRI results with patient by phone. She confirms no facial birthmark. Referrals to Ophthalmology and Neurosurgery placed. All questions answered.

## 2024-12-09 ENCOUNTER — PATIENT MESSAGE (OUTPATIENT)
Dept: NEUROLOGY | Facility: CLINIC | Age: 29
End: 2024-12-09
Payer: MEDICAID

## 2025-01-03 ENCOUNTER — OFFICE VISIT (OUTPATIENT)
Dept: CARDIOLOGY | Facility: CLINIC | Age: 30
End: 2025-01-03
Payer: MEDICAID

## 2025-01-03 VITALS
OXYGEN SATURATION: 97 % | BODY MASS INDEX: 30.97 KG/M2 | HEART RATE: 72 BPM | SYSTOLIC BLOOD PRESSURE: 122 MMHG | DIASTOLIC BLOOD PRESSURE: 80 MMHG | WEIGHT: 174.81 LBS | HEIGHT: 63 IN

## 2025-01-03 DIAGNOSIS — R55 SYNCOPE, UNSPECIFIED SYNCOPE TYPE: ICD-10-CM

## 2025-01-03 PROCEDURE — 99213 OFFICE O/P EST LOW 20 MIN: CPT | Mod: PBBFAC,PO | Performed by: INTERNAL MEDICINE

## 2025-01-03 PROCEDURE — 99999 PR PBB SHADOW E&M-EST. PATIENT-LVL III: CPT | Mod: PBBFAC,,, | Performed by: INTERNAL MEDICINE

## 2025-01-03 NOTE — PROGRESS NOTES
Subjective   Patient ID:  Kary Petit is a 29 y.o. female who presents for follow-up of No chief complaint on file.      HPI    Saw Dr Mohsen [facility] 12/12/24    Kary Petit is a 29 y.o. female with past medical history of anxiety who presented to clinic to establish care.  She was referred for shortness of breath.  Denied any chest pain, orthopnea or PND.  Denied any palpitations.  According to her she is able to walk for miles without any chest pain or shortness of breath.  She smokes marijuana.  Never had any cardiac condition before.     Went to the ER 10/17/24  Pt with new sz. Daughter has absence seizures. Family hx. Pt denies substance use, no new medictions, no prior sz. Fiance witnessed tongue biting, urinary incontinence and GTC with postictal state. CTH with calcifications that were present previously but not necessarily pathologic or serving as focus of seizure. Discussed with neuro who recommended outpatient continued work up, ie MRI/EEG and keppra load with rx for now. Referral for neuro outpatient. Pt had no further sx. Resident discussed seizure precautions (no driving, no showering alone, etc), stable for discharge     EKG 10/17/24 NSR - ok    1/3/25 Here to evaluate for recurrent episodes of syncope. Initially thought to be seizures - was placed on brivaracetam but reports 2 futher episodes in the last month    Review of Systems   Constitutional: Negative for decreased appetite.   HENT:  Negative for ear discharge.    Eyes:  Negative for blurred vision.   Respiratory:  Negative for hemoptysis.    Endocrine: Negative for polyphagia.   Hematologic/Lymphatic: Negative for adenopathy.   Skin:  Negative for color change.   Musculoskeletal:  Negative for joint swelling.   Genitourinary:  Negative for bladder incontinence.   Neurological:  Negative for brief paralysis.   Psychiatric/Behavioral:  Negative for hallucinations.    Allergic/Immunologic: Negative for hives.          Objective      Physical Exam  Constitutional:       Appearance: She is well-developed.   HENT:      Head: Normocephalic and atraumatic.   Eyes:      Conjunctiva/sclera: Conjunctivae normal.      Pupils: Pupils are equal, round, and reactive to light.   Cardiovascular:      Rate and Rhythm: Normal rate.      Pulses: Intact distal pulses.      Heart sounds: Normal heart sounds.   Pulmonary:      Effort: Pulmonary effort is normal.      Breath sounds: Normal breath sounds.   Abdominal:      General: Bowel sounds are normal.      Palpations: Abdomen is soft.   Musculoskeletal:         General: Normal range of motion.      Cervical back: Normal range of motion and neck supple.   Skin:     General: Skin is warm and dry.   Neurological:      Mental Status: She is alert and oriented to person, place, and time.            Assessment and Plan     1. Syncope, unspecified syncope type        Plan:     Echo and event monitor for recurrent syncope - likely related to seizures    Advance Care Planning     Date: 01/03/2025  Patient did not wish or was not able to name a surrogate decision maker or provide an Advance Care Plan.

## 2025-01-31 ENCOUNTER — HOSPITAL ENCOUNTER (EMERGENCY)
Facility: HOSPITAL | Age: 30
Discharge: HOME | End: 2025-01-31
Attending: EMERGENCY MEDICINE
Payer: MEDICAID

## 2025-01-31 VITALS
WEIGHT: 174 LBS | SYSTOLIC BLOOD PRESSURE: 120 MMHG | RESPIRATION RATE: 17 BRPM | DIASTOLIC BLOOD PRESSURE: 82 MMHG | TEMPERATURE: 98 F | HEIGHT: 63 IN | OXYGEN SATURATION: 100 % | BODY MASS INDEX: 30.83 KG/M2 | HEART RATE: 73 BPM

## 2025-01-31 DIAGNOSIS — G44.209 ACUTE NON INTRACTABLE TENSION-TYPE HEADACHE: ICD-10-CM

## 2025-01-31 DIAGNOSIS — R56.9 SEIZURE: Primary | ICD-10-CM

## 2025-01-31 LAB
ALBUMIN SERPL BCP-MCNC: 3.8 G/DL (ref 3.5–5.2)
ALP SERPL-CCNC: 71 U/L (ref 40–150)
ALT SERPL W/O P-5'-P-CCNC: 26 U/L (ref 10–44)
ANION GAP SERPL CALC-SCNC: 9 MMOL/L (ref 8–16)
AST SERPL-CCNC: 31 U/L (ref 10–40)
BASOPHILS # BLD AUTO: 0.04 K/UL (ref 0–0.2)
BASOPHILS NFR BLD: 0.7 % (ref 0–1.9)
BILIRUB SERPL-MCNC: 0.2 MG/DL (ref 0.1–1)
BUN SERPL-MCNC: 5 MG/DL (ref 6–20)
CALCIUM SERPL-MCNC: 8.8 MG/DL (ref 8.7–10.5)
CHLORIDE SERPL-SCNC: 110 MMOL/L (ref 95–110)
CO2 SERPL-SCNC: 18 MMOL/L (ref 23–29)
CREAT SERPL-MCNC: 0.6 MG/DL (ref 0.5–1.4)
DIFFERENTIAL METHOD BLD: ABNORMAL
EOSINOPHIL # BLD AUTO: 0.2 K/UL (ref 0–0.5)
EOSINOPHIL NFR BLD: 3.9 % (ref 0–8)
ERYTHROCYTE [DISTWIDTH] IN BLOOD BY AUTOMATED COUNT: 14.4 % (ref 11.5–14.5)
EST. GFR  (NO RACE VARIABLE): >60 ML/MIN/1.73 M^2
GLUCOSE SERPL-MCNC: 103 MG/DL (ref 70–110)
HCT VFR BLD AUTO: 36.1 % (ref 37–48.5)
HGB BLD-MCNC: 12 G/DL (ref 12–16)
IMM GRANULOCYTES # BLD AUTO: 0.03 K/UL (ref 0–0.04)
IMM GRANULOCYTES NFR BLD AUTO: 0.5 % (ref 0–0.5)
LYMPHOCYTES # BLD AUTO: 1.8 K/UL (ref 1–4.8)
LYMPHOCYTES NFR BLD: 31.1 % (ref 18–48)
MCH RBC QN AUTO: 28.5 PG (ref 27–31)
MCHC RBC AUTO-ENTMCNC: 33.2 G/DL (ref 32–36)
MCV RBC AUTO: 86 FL (ref 82–98)
MONOCYTES # BLD AUTO: 0.4 K/UL (ref 0.3–1)
MONOCYTES NFR BLD: 7 % (ref 4–15)
NEUTROPHILS # BLD AUTO: 3.3 K/UL (ref 1.8–7.7)
NEUTROPHILS NFR BLD: 56.8 % (ref 38–73)
NRBC BLD-RTO: 0 /100 WBC
PLATELET # BLD AUTO: 289 K/UL (ref 150–450)
PMV BLD AUTO: 10.8 FL (ref 9.2–12.9)
POTASSIUM SERPL-SCNC: 3.8 MMOL/L (ref 3.5–5.1)
PROT SERPL-MCNC: 7.7 G/DL (ref 6–8.4)
RBC # BLD AUTO: 4.21 M/UL (ref 4–5.4)
SODIUM SERPL-SCNC: 137 MMOL/L (ref 136–145)
WBC # BLD AUTO: 5.86 K/UL (ref 3.9–12.7)

## 2025-01-31 PROCEDURE — 25000003 PHARM REV CODE 250: Performed by: EMERGENCY MEDICINE

## 2025-01-31 PROCEDURE — 80053 COMPREHEN METABOLIC PANEL: CPT | Performed by: EMERGENCY MEDICINE

## 2025-01-31 PROCEDURE — 85025 COMPLETE CBC W/AUTO DIFF WBC: CPT | Performed by: EMERGENCY MEDICINE

## 2025-01-31 PROCEDURE — 99283 EMERGENCY DEPT VISIT LOW MDM: CPT

## 2025-01-31 PROCEDURE — 25000003 PHARM REV CODE 250

## 2025-01-31 RX ORDER — ACETAMINOPHEN 325 MG/1
650 TABLET ORAL
Status: COMPLETED | OUTPATIENT
Start: 2025-01-31 | End: 2025-01-31

## 2025-01-31 RX ORDER — LIDOCAINE HYDROCHLORIDE 20 MG/ML
5 SOLUTION OROPHARYNGEAL
Status: COMPLETED | OUTPATIENT
Start: 2025-01-31 | End: 2025-01-31

## 2025-01-31 RX ADMIN — ACETAMINOPHEN 650 MG: 325 TABLET ORAL at 06:01

## 2025-01-31 RX ADMIN — BRIVARACETAM 50 MG: 25 TABLET, FILM COATED ORAL at 06:01

## 2025-01-31 RX ADMIN — LIDOCAINE HYDROCHLORIDE 5 ML: 20 SOLUTION ORAL at 06:01

## 2025-01-31 NOTE — ED PROVIDER NOTES
Encounter Date: 1/31/2025       History     Chief Complaint   Patient presents with    Seizures     Tonic clonic seizure just PTA. Post ictal     Patient is a 29-year-old with past medical history of seizures presenting due to seizure.  Significant other states that he woke up in the middle of the night to patient having a seizure.  States that was about 350 a.m..  States the seizure lasted about 5 minutes. Reports that the patient was confused immediately afterwards. Confusion lasted until she got to the hospital.  Patient states that 2 days ago she tried to refill her prescription for seizure medications but was unable to.  States the pharmacy was out of stock.  Denies any chest pain, shortness of breath, changes in vision, changes in hearing, fevers, changes in urination, changes in bowel movements.  Since the seizure patient says that her mouth hurts where she bit her tongue and she has some upper abdominal pain.  States that she has a mild headache.  States that she had a headache after she had her previous seizure.        Review of patient's allergies indicates:   Allergen Reactions    Amoxicillin Swelling    Augmentin [amoxicillin-pot clavulanate]     Naproxen      rash     Past Medical History:   Diagnosis Date    Depression     Migraine headache     Sinusitis      Past Surgical History:   Procedure Laterality Date    HYSTERECTOMY       Family History   Problem Relation Name Age of Onset    Hypertension Mother      Ovarian cancer Mother      Colon cancer Maternal Aunt       Social History     Tobacco Use    Smoking status: Never    Smokeless tobacco: Never   Substance Use Topics    Alcohol use: No    Drug use: No     Review of Systems  Per HPI  Physical Exam     Initial Vitals [01/31/25 0408]   BP Pulse Resp Temp SpO2   134/73 110 16 97.6 °F (36.4 °C) 98 %      MAP       --         Physical Exam    Constitutional: She appears well-developed and well-nourished. She is not diaphoretic. No distress.   HENT:    Head: Normocephalic.   Some dried blood in mouth.  Tongue has minute lacerations   Eyes: Conjunctivae and EOM are normal. Pupils are equal, round, and reactive to light. Right eye exhibits no discharge. Left eye exhibits no discharge. No scleral icterus.   Cardiovascular:  Normal rate, regular rhythm and normal heart sounds.           No murmur heard.  Pulmonary/Chest: Breath sounds normal. No stridor. No respiratory distress. She has no wheezes. She has no rhonchi. She has no rales.   Abdominal: Abdomen is soft. She exhibits no distension. There is no abdominal tenderness. There is no rebound and no guarding.   Musculoskeletal:         General: No tenderness or edema.     Neurological: She is alert. No cranial nerve deficit. GCS score is 15. GCS eye subscore is 4. GCS verbal subscore is 5. GCS motor subscore is 6.   Skin: Skin is warm and dry. Capillary refill takes less than 2 seconds.   Psychiatric: She has a normal mood and affect.         ED Course   Procedures  Labs Reviewed   CBC W/ AUTO DIFFERENTIAL - Abnormal       Result Value    WBC 5.86      RBC 4.21      Hemoglobin 12.0      Hematocrit 36.1 (*)     MCV 86      MCH 28.5      MCHC 33.2      RDW 14.4      Platelets 289      MPV 10.8      Immature Granulocytes 0.5      Gran # (ANC) 3.3      Immature Grans (Abs) 0.03      Lymph # 1.8      Mono # 0.4      Eos # 0.2      Baso # 0.04      nRBC 0      Gran % 56.8      Lymph % 31.1      Mono % 7.0      Eosinophil % 3.9      Basophil % 0.7      Differential Method Automated     COMPREHENSIVE METABOLIC PANEL - Abnormal    Sodium 137      Potassium 3.8      Chloride 110      CO2 18 (*)     Glucose 103      BUN 5 (*)     Creatinine 0.6      Calcium 8.8      Total Protein 7.7      Albumin 3.8      Total Bilirubin 0.2      Alkaline Phosphatase 71      AST 31      ALT 26      eGFR >60.0      Anion Gap 9            Imaging Results    None          Medications   brivaracetam tablet 50 mg (50 mg Oral Given 1/31/25 0611)    LIDOcaine viscous HCl 2% oral solution 5 mL (5 mLs Oral Given 1/31/25 0629)   acetaminophen tablet 650 mg (650 mg Oral Given 1/31/25 0629)     Medical Decision Making  Patient is a 29-year-old afebrile, HDS, in no acute distress female presenting due to seizure.    DDX:  Seizure, CVA, meningitis, psychogenic seizures    Patient is neurologically intact.  No significant headache.  No recent head trauma.  Patient was in bed when she had her seizure.  Low suspicion for CVA.  Patient afebrile.  No leukocytosis.  No neck stiffness or pain. Low suspicion for meningitis. Patient has laceration on side of her tongue with dried blood in mouth. Hx of seizures. Recently ran out of medications 2 days ago. Patient back at her baseline. Stable in the ED. No further seizure activity. Given dose of home medication. Refilled prescription at [facility] pharmacy. Plan to discharge home. Discussed with patient who is in agreement. Questions answered. Return precautions given.    Amount and/or Complexity of Data Reviewed  Labs: ordered. Decision-making details documented in ED Course.    Risk  OTC drugs.  Prescription drug management.               ED Course as of 01/31/25 0649   Fri Jan 31, 2025   0510 WBC: 5.86 [MB]   0510 Hemoglobin: 12.0 [MB]   0615 BP: 120/82 [MB]   0615 MAP (mmHg): 97 [MB]   0615 Temp: 98 °F (36.7 °C) [MB]      ED Course User Index  [MB] James Carson MD                           Clinical Impression:  Final diagnoses:  [R56.9] Seizure (Primary)  [G44.209] Acute non intractable tension-type headache          ED Disposition Condition    Discharge Stable          ED Prescriptions       Medication Sig Dispense Start Date End Date Auth. Provider    brivaracetam (BRIVIACT) 50 mg Tab Take 1 tablet (50 mg total) by mouth 2 (two) times daily. 60 tablet 1/31/2025 -- James Carson MD          Follow-up Information    None          James Carson MD  Resident  01/31/25 0650

## 2025-01-31 NOTE — PROVIDER PROGRESS NOTES - EMERGENCY DEPT.
Encounter Date: 1/31/2025    ED Physician Progress Notes         Patient signed out to me. Vital signs stable. Patient arrived for evaluation of breakthrough seizures. Unable to refill sz medications 2 days ago.  CBC and CMP within normal limits.  Treated with brivaracetam. Patient provided with prescription of brivaracetam. Patient discharged with return precautions explained.

## 2025-01-31 NOTE — DISCHARGE INSTRUCTIONS
Please return to the emergency room if you develop another seizure or fevers. We believe your seizure that you had is likely related to your missed doses of your seizure medication.     For headaches we recommend tylenol for now.

## 2025-01-31 NOTE — ED TRIAGE NOTES
Kary NIKKIE Petit, a 29 y.o. female presents to the ED w/ complaint of seizures. PTA and lasted several minutes per friend. Missed 2 days of meds.    Triage note:  Chief Complaint   Patient presents with    Seizures     Tonic clonic seizure just PTA. Post ictal     Review of patient's allergies indicates:   Allergen Reactions    Amoxicillin Swelling    Augmentin [amoxicillin-pot clavulanate]     Naproxen      rash     Past Medical History:   Diagnosis Date    Depression     Migraine headache     Sinusitis

## 2025-02-05 ENCOUNTER — OFFICE VISIT (OUTPATIENT)
Dept: NEUROLOGY | Facility: CLINIC | Age: 30
End: 2025-02-05
Payer: MEDICAID

## 2025-02-05 VITALS — DIASTOLIC BLOOD PRESSURE: 82 MMHG | HEART RATE: 63 BPM | SYSTOLIC BLOOD PRESSURE: 139 MMHG

## 2025-02-05 DIAGNOSIS — R56.9 SEIZURE: Primary | ICD-10-CM

## 2025-02-05 PROCEDURE — 3075F SYST BP GE 130 - 139MM HG: CPT | Mod: CPTII,,, | Performed by: STUDENT IN AN ORGANIZED HEALTH CARE EDUCATION/TRAINING PROGRAM

## 2025-02-05 PROCEDURE — 99214 OFFICE O/P EST MOD 30 MIN: CPT | Mod: S$PBB,,, | Performed by: STUDENT IN AN ORGANIZED HEALTH CARE EDUCATION/TRAINING PROGRAM

## 2025-02-05 PROCEDURE — 99212 OFFICE O/P EST SF 10 MIN: CPT | Mod: PBBFAC | Performed by: STUDENT IN AN ORGANIZED HEALTH CARE EDUCATION/TRAINING PROGRAM

## 2025-02-05 PROCEDURE — 3079F DIAST BP 80-89 MM HG: CPT | Mod: CPTII,,, | Performed by: STUDENT IN AN ORGANIZED HEALTH CARE EDUCATION/TRAINING PROGRAM

## 2025-02-05 PROCEDURE — 99999 PR PBB SHADOW E&M-EST. PATIENT-LVL II: CPT | Mod: PBBFAC,,, | Performed by: STUDENT IN AN ORGANIZED HEALTH CARE EDUCATION/TRAINING PROGRAM

## 2025-02-05 PROCEDURE — 1159F MED LIST DOCD IN RCRD: CPT | Mod: CPTII,,, | Performed by: STUDENT IN AN ORGANIZED HEALTH CARE EDUCATION/TRAINING PROGRAM
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
## 2025-02-05 NOTE — ASSESSMENT & PLAN NOTE
- agree with increasing BRIV to 75 mg BID as per neurologist she saw yesterday  - reviewed seizure precautions  - will follow up at [facility]

## 2025-02-05 NOTE — PATIENT INSTRUCTIONS
Epilepsy [place] [facility] [place]: [email or web address]    I sent the prescription for the higher dose of Briviact to the [facility] pharmacy (the new dose is 75 mg twice a day).

## 2025-02-07 ENCOUNTER — CLINICAL SUPPORT (OUTPATIENT)
Dept: CARDIOLOGY | Facility: HOSPITAL | Age: 30
End: 2025-02-07
Attending: INTERNAL MEDICINE
Payer: MEDICAID

## 2025-02-07 DIAGNOSIS — R55 SYNCOPE, UNSPECIFIED SYNCOPE TYPE: ICD-10-CM

## 2025-02-07 PROCEDURE — 93271 ECG/MONITORING AND ANALYSIS: CPT

## 2025-02-28 ENCOUNTER — TELEPHONE (OUTPATIENT)
Dept: CARDIOLOGY | Facility: HOSPITAL | Age: 30
End: 2025-02-28
Payer: MEDICAID

## 2025-02-28 NOTE — TELEPHONE ENCOUNTER
----- Message from Taco sent at 2/28/2025 11:29 AM CST -----  Patient is calling to see if she is able to come in today and get more of the sticky patches for the event monitor. Stated she does not have anymore after today, she can be reached at [phone].Thank you

## 2025-02-28 NOTE — TELEPHONE ENCOUNTER
Return call placed to Mrs. Petit to inform  that more electrodes will be available to  from the  today.

## 2025-04-01 ENCOUNTER — HOSPITAL ENCOUNTER (EMERGENCY)
Facility: HOSPITAL | Age: 30
Discharge: HOME | End: 2025-04-01
Attending: EMERGENCY MEDICINE
Payer: MEDICAID

## 2025-04-01 VITALS
RESPIRATION RATE: 18 BRPM | TEMPERATURE: 99 F | BODY MASS INDEX: 30.82 KG/M2 | HEART RATE: 72 BPM | SYSTOLIC BLOOD PRESSURE: 123 MMHG | DIASTOLIC BLOOD PRESSURE: 75 MMHG | HEIGHT: 63 IN | WEIGHT: 173.94 LBS | OXYGEN SATURATION: 100 %

## 2025-04-01 DIAGNOSIS — G40.919 BREAKTHROUGH SEIZURE: Primary | ICD-10-CM

## 2025-04-01 DIAGNOSIS — R07.9 CHEST PAIN: ICD-10-CM

## 2025-04-01 LAB
ABSOLUTE EOSINOPHIL (OHS): 0.15 K/UL
ABSOLUTE MONOCYTE (OHS): 0.74 K/UL (ref 0.3–1)
ABSOLUTE NEUTROPHIL COUNT (OHS): 5.63 K/UL (ref 1.8–7.7)
ALBUMIN SERPL BCP-MCNC: 4 G/DL (ref 3.5–5.2)
ALP SERPL-CCNC: 81 UNIT/L (ref 40–150)
ALT SERPL W/O P-5'-P-CCNC: 18 UNIT/L (ref 10–44)
ANION GAP (OHS): 6 MMOL/L (ref 8–16)
AST SERPL-CCNC: 18 UNIT/L (ref 11–45)
BASOPHILS # BLD AUTO: 0.04 K/UL
BASOPHILS NFR BLD AUTO: 0.5 %
BILIRUB SERPL-MCNC: 0.3 MG/DL (ref 0.1–1)
BUN SERPL-MCNC: 5 MG/DL (ref 6–20)
CALCIUM SERPL-MCNC: 9.4 MG/DL (ref 8.7–10.5)
CHLORIDE SERPL-SCNC: 110 MMOL/L (ref 95–110)
CO2 SERPL-SCNC: 22 MMOL/L (ref 23–29)
CREAT SERPL-MCNC: 0.7 MG/DL (ref 0.5–1.4)
ERYTHROCYTE [DISTWIDTH] IN BLOOD BY AUTOMATED COUNT: 12.6 % (ref 11.5–14.5)
GFR SERPLBLD CREATININE-BSD FMLA CKD-EPI: >60 ML/MIN/1.73/M2
GLUCOSE SERPL-MCNC: 87 MG/DL (ref 70–110)
HCT VFR BLD AUTO: 41.8 % (ref 37–48.5)
HGB BLD-MCNC: 13.8 GM/DL (ref 12–16)
IMM GRANULOCYTES # BLD AUTO: 0.04 K/UL (ref 0–0.04)
IMM GRANULOCYTES NFR BLD AUTO: 0.5 % (ref 0–0.5)
LYMPHOCYTES # BLD AUTO: 1.78 K/UL (ref 1–4.8)
MAGNESIUM SERPL-MCNC: 2.1 MG/DL (ref 1.6–2.6)
MCH RBC QN AUTO: 28.3 PG (ref 27–31)
MCHC RBC AUTO-ENTMCNC: 33 G/DL (ref 32–36)
MCV RBC AUTO: 86 FL (ref 82–98)
NUCLEATED RBC (/100WBC) (OHS): 0 /100 WBC
OHS QRS DURATION: 76 MS
OHS QTC CALCULATION: 457 MS
PLATELET # BLD AUTO: 313 K/UL (ref 150–450)
PMV BLD AUTO: 10.8 FL (ref 9.2–12.9)
POTASSIUM SERPL-SCNC: 4 MMOL/L (ref 3.5–5.1)
PROT SERPL-MCNC: 8 GM/DL (ref 6–8.4)
RBC # BLD AUTO: 4.87 M/UL (ref 4–5.4)
RELATIVE EOSINOPHIL (OHS): 1.8 %
RELATIVE LYMPHOCYTE (OHS): 21.2 % (ref 18–48)
RELATIVE MONOCYTE (OHS): 8.8 % (ref 4–15)
RELATIVE NEUTROPHIL (OHS): 67.2 % (ref 38–73)
SODIUM SERPL-SCNC: 138 MMOL/L (ref 136–145)
WBC # BLD AUTO: 8.38 K/UL (ref 3.9–12.7)

## 2025-04-01 PROCEDURE — 83735 ASSAY OF MAGNESIUM: CPT | Performed by: NURSE PRACTITIONER

## 2025-04-01 PROCEDURE — 96375 TX/PRO/DX INJ NEW DRUG ADDON: CPT

## 2025-04-01 PROCEDURE — 96361 HYDRATE IV INFUSION ADD-ON: CPT

## 2025-04-01 PROCEDURE — 85025 COMPLETE CBC W/AUTO DIFF WBC: CPT | Performed by: NURSE PRACTITIONER

## 2025-04-01 PROCEDURE — 99284 EMERGENCY DEPT VISIT MOD MDM: CPT | Mod: 25

## 2025-04-01 PROCEDURE — 82040 ASSAY OF SERUM ALBUMIN: CPT | Performed by: NURSE PRACTITIONER

## 2025-04-01 PROCEDURE — 63600175 PHARM REV CODE 636 W HCPCS: Mod: JZ,TB | Performed by: NURSE PRACTITIONER

## 2025-04-01 PROCEDURE — 25000003 PHARM REV CODE 250: Performed by: NURSE PRACTITIONER

## 2025-04-01 PROCEDURE — 96374 THER/PROPH/DIAG INJ IV PUSH: CPT

## 2025-04-01 RX ORDER — DROPERIDOL 2.5 MG/ML
0.62 INJECTION, SOLUTION INTRAMUSCULAR; INTRAVENOUS
Status: COMPLETED | OUTPATIENT
Start: 2025-04-01 | End: 2025-04-01

## 2025-04-01 RX ORDER — KETOROLAC TROMETHAMINE 30 MG/ML
15 INJECTION, SOLUTION INTRAMUSCULAR; INTRAVENOUS
Status: COMPLETED | OUTPATIENT
Start: 2025-04-01 | End: 2025-04-01

## 2025-04-01 RX ORDER — BUTALBITAL, ACETAMINOPHEN AND CAFFEINE 50; 325; 40 MG/1; MG/1; MG/1
1 TABLET ORAL EVERY 6 HOURS PRN
Qty: 20 TABLET | Refills: 0 | Status: SHIPPED | OUTPATIENT
Start: 2025-04-01 | End: 2025-04-06

## 2025-04-01 RX ORDER — DIPHENHYDRAMINE HYDROCHLORIDE 50 MG/ML
12.5 INJECTION, SOLUTION INTRAMUSCULAR; INTRAVENOUS
Status: COMPLETED | OUTPATIENT
Start: 2025-04-01 | End: 2025-04-01

## 2025-04-01 RX ADMIN — DROPERIDOL 0.62 MG: 2.5 INJECTION, SOLUTION INTRAMUSCULAR; INTRAVENOUS at 05:04

## 2025-04-01 RX ADMIN — DIPHENHYDRAMINE HYDROCHLORIDE 12.5 MG: 50 INJECTION, SOLUTION INTRAMUSCULAR; INTRAVENOUS at 05:04

## 2025-04-01 RX ADMIN — SODIUM CHLORIDE 1000 ML: 9 INJECTION, SOLUTION INTRAVENOUS at 05:04

## 2025-04-01 RX ADMIN — KETOROLAC TROMETHAMINE 15 MG: 30 INJECTION, SOLUTION INTRAMUSCULAR; INTRAVENOUS at 05:04

## 2025-04-01 NOTE — ED PROVIDER NOTES
"Source of History:  Patient, chart, family    Chief complaint:  Altered Mental Status and Seizures (Pt had a witnessed seizure in triage. +bite to R. Side of tongue and cheek)      HPI:  Kary Petit is a 29 y.o. female with medical history of seizures, migraine presenting with breakthrough seizure.  Patient states she had a witnessed seizure earlier today.  Patient states she started with a 10/10 headache the past 2 days.  Patient states with her recent breakthrough seizure she had headache prior to the seizure episode.  Patient states she has been compliant with her seizure medication    This is the extent to the patients complaints today here in the emergency department.    ROS: As per HPI and below:  Constitutional: No fever.  No chills.  Eyes: No visual changes.  ENT: No sore throat. No ear pain    Cardiovascular: No chest pain.  Respiratory: No shortness of breath.  GI: No abdominal pain.  No nausea or vomiting.  Genitourinary: No abnormal urination.  Neurologic:  Positive for seizure-like activity.  MSK: no back pain.  Integument: No rashes or lesions.  Hematologic: No easy bruising.  Endocrine: No excessive thirst or urination.    Review of patient's allergies indicates:   Allergen Reactions    Amoxicillin Swelling    Augmentin [amoxicillin-pot clavulanate]     Naproxen      rash       PMH:  As per HPI and below:  Past Medical History:   Diagnosis Date    Depression     Migraine headache     Sinusitis     Sturge-Tao syndrome      Past Surgical History:   Procedure Laterality Date    HYSTERECTOMY         Social History[1]    Physical Exam:    /85 (BP Location: Left arm, Patient Position: Lying)   Pulse 74   Temp 98.5 °F (36.9 °C) (Oral)   Resp 16   Ht 5' 3" (1.6 m)   Wt 78.9 kg (173 lb 15.1 oz)   LMP 07/15/2024   SpO2 100%   BMI 30.81 kg/m²   Nursing note and vital signs reviewed.  Constitutional: No acute distress.  Nontoxic  Eyes: No conjunctival injection.  Extraocular muscles are " intact.  ENT: Oropharynx clear.  Bite to right side of tongue noted.  Bleeding controlled.  Cardiovascular: Regular rate and rhythm.  No murmurs. No gallops. No rubs.  Respiratory: Clear to auscultation bilaterally.  Good air movement.  No wheezes.  No rhonchi. No rales. No accessory muscle use.  Abdomen: Soft.  Not distended.  Nontender.  No guarding.  No rebound. Non-peritoneal.  Musculoskeletal: Good range of motion all joints.  No deformities.  Neck supple.  No meningismus.  Skin: No rashes seen.  Good turgor.  No abrasions.  No ecchymoses.  Neurologic:  Cranial nerves 2-12 are intact.  All extremities are 5/5 strength.  Motor and sensory intact.  No ataxia.  Negative Romberg test.  No cerebellar findings.  Good finger-to-nose.  No focal neurological deficits.  Psych:  Appropriate, conversant    MDM    Emergent evaluation of a 28 yo female presenting for seizure-like activity.  Patient states she has had headache for the past few days and states for the past few breakthrough seizures she has been having headaches prior to seizure-like activity.  Patient states she is compliant with her seizure medication.  Patient states she does have a neurologist but feels that they are not listening to her.  Patient states her seizures typically gait occur while she was sleeping.  Patient states headache began 2 days ago and worsened last night.  Patient denies any blurred vision, dizziness or weakness.  On exam pt is A&Ox3. VSS. Nonfebrile and nontoxic appearing.  Pupils equal round reactive 3-2 mm.  No nystagmus.  Mucous membranes pink and moist.  Small laceration noted to right side of tongue due to patient biting it during seizure-like activity.  No other signs of trauma.  No loose teeth.  Breath sounds clear bilaterally.  Abdomen soft and nontender. No rebound or guarding appreciated on exam.   BS WNL.  Pt speaking in full sentences.  Steady gait appreciated. Cap refill < 3 seconds.      History Acquisition    Additional history was acquired from other historians.  Family, chart    The patient's list of active medical problems, social history, medications, and allergies as documented per RN staff has been reviewed.     Differential Diagnoses   Based on available information and the initial assessment, the working differential diagnoses considered during this evaluation include but are not limited to seizure, breakthrough seizure, medication noncompliance, UTI, migraine headache, others.    I will get labs, medicate, hydrate and reassess.  I discussed this case with my supervising physician.      LABS     Labs Reviewed   COMPREHENSIVE METABOLIC PANEL - Abnormal       Result Value    Sodium 138      Potassium 4.0      Chloride 110      CO2 22 (*)     Glucose 87      BUN 5 (*)     Creatinine 0.7      Calcium 9.4      Protein Total 8.0      Albumin 4.0      Bilirubin Total 0.3      ALP 81      AST 18      ALT 18      Anion Gap 6 (*)     eGFR >60     MAGNESIUM - Normal    Magnesium  2.1     CBC WITH DIFFERENTIAL - Normal    WBC 8.38      RBC 4.87      HGB 13.8      HCT 41.8      MCV 86      MCH 28.3      MCHC 33.0      RDW 12.6      Platelet Count 313      MPV 10.8      Nucleated RBC 0      Neut % 67.2      Lymph % 21.2      Mono % 8.8      Eos % 1.8      Basophil % 0.5      Imm Grans % 0.5      Neut # 5.63      Lymph # 1.78      Mono # 0.74      Eos # 0.15      Baso # 0.04      Imm Grans # 0.04     CBC W/ AUTO DIFFERENTIAL    Narrative:     The following orders were created for panel order CBC auto differential.  Procedure                               Abnormality         Status                     ---------                               -----------         ------                     CBC with Differential[[id number]]       Normal              Final result                 Please view results for these tests on the individual orders.   URINALYSIS, REFLEX TO URINE CULTURE     EKG   EKG Readings: (Independently Interpreted)    Initial Reading: No STEMI. Previous EKG: Compared with most recent EKG Previous EKG Date: 10/17/24. Rhythm: Sinus Tachycardia.   My independent interpretation    No STEMI  Sinus tachycardia  Rate of 117       Additional Consideration   All available testing was considered during the course of this workup.  Comorbidities taken into consideration during the patient's evaluation and treatment include weight, age.    Social determinants of health were taken into consideration during development of our treatment plan.    Medications   droPERidol injection 0.625 mg (0.625 mg Intravenous Given 4/1/25 1724)   diphenhydrAMINE injection 12.5 mg (12.5 mg Intravenous Given 4/1/25 1726)   sodium chloride 0.9% bolus 1,000 mL 1,000 mL (0 mLs Intravenous Stopped 4/1/25 1825)   ketorolac injection 15 mg (15 mg Intravenous Given 4/1/25 1722)      ED Course as of 04/01/25 1830   Tue Apr 01, 2025   1730 CBC unremarkable.  No leukocytosis noted.  H&H stable at 13.8 and 41.8.  CMP unremarkable.  Mag within normal limits.  Will reassess patient after medication [RZ]   1825 Patient reassessed.  Patient states feeling much better after meds and fluids.  Advised to continue take her medications as prescribed increase fluids.  Patient states she does take Excedrin for migraine headaches.  Advised will prescribe Fioricet for breakthrough.  Advised to follow up with neurologist as scheduled.  Return precautions discussed.  Patient verbalized understanding of this plan of care.  All questions and concerns and concerns addressed [RZ]   1828 Patient is hemodynamically stable, vital signs are normal. Discharge instructions given. Return to ED precautions discussed. Follow up as directed. Pt verbalized understanding of this plan.  Pt is stable for discharge.  [RZ]      ED Course User Index  [RZ] Colleen Torres NP             CLINICAL IMPRESSION  1. Breakthrough seizure    2. Chest pain         ED Disposition Condition    Discharge Stable             Instruction:  I see no indication of an emergent process beyond that addressed during our encounter but have duly counseled the patient/family regarding the need for prompt follow-up as well as the indications that should prompt immediate return to the emergency room should new or worrisome developments occur.  The patient/family has been provided with verbal and printed direction regarding our final diagnosis(es) as well as instructions regarding use of OTC and/or Rx medications intended to manage the patient's aforementioned conditions including:  ED Prescriptions       Medication Sig Dispense Start Date End Date Auth. Provider    butalbital-acetaminophen-caffeine -40 mg (FIORICET, ESGIC) -40 mg per tablet Take 1 tablet by mouth every 6 (six) hours as needed for Headaches. 20 tablet 4/1/2025 4/6/2025 Colleen Torres, PHYLLIS          Patient has been advised of following recommended follow-up instructions:  Follow-up Information       Follow up With Specialties Details Why Contact Info    Gerry Mcelroy MD Family Medicine, Wound Care Schedule an appointment as soon as possible for a visit  As needed [address] [facility] [id number] [phone]      Gerry Mcelroy MD Family Medicine, Wound Care Schedule an appointment as soon as possible for a visit  As needed [address] [place] [address] [phone]      Omar Dozier MD Neurology Schedule an appointment as soon as possible for a visit  As needed [address] [facility]  [address]  [place] [id number] [phone]            The patient/family communicates understanding of all this information and all remaining questions and concerns were addressed at this time.      The patient's condition did warrant review of the  and prescription of controlled substances.      This note was created using dictation software.  This program may occasionally mistype words and phrases.           [1]   Social History  Tobacco Use    Smoking status:  Never    Smokeless tobacco: Never   Substance Use Topics    Alcohol use: No    Drug use: No        Colleen Torres, NP  04/01/25 [id number]

## 2025-04-01 NOTE — DISCHARGE INSTRUCTIONS
You were seen and evaluated in the ER today.  You likely had a breakthrough seizure please continue to take your seizure medications as prescribed.  We have prescribed you medications to help with your migraine headaches.  Please follow up with your neurologist as scheduled  Please follow-up with your PCP as needed.  Please return to the ED for any worsening symptoms such as chest pain, shortness of breath, fever not controlled with Tylenol or ibuprofen or uncontrolled pain.      Our goal in the emergency department is to always give you outstanding care and exceptional service. You may receive a survey by mail or e-mail in the next week regarding your experience in our ED. We would greatly appreciate your completing and returning the survey. Your feedback provides us with a way to recognize our staff who give very good care and it helps us learn how to improve when your experience was below our aspiration of excellence.